# Patient Record
Sex: FEMALE | Race: WHITE | NOT HISPANIC OR LATINO | Employment: OTHER | URBAN - METROPOLITAN AREA
[De-identification: names, ages, dates, MRNs, and addresses within clinical notes are randomized per-mention and may not be internally consistent; named-entity substitution may affect disease eponyms.]

---

## 2017-03-28 ENCOUNTER — OFFICE VISIT (OUTPATIENT)
Dept: OPTOMETRY | Facility: CLINIC | Age: 77
End: 2017-03-28
Payer: MEDICARE

## 2017-03-28 DIAGNOSIS — H52.4 MYOPIA WITH ASTIGMATISM AND PRESBYOPIA, BILATERAL: ICD-10-CM

## 2017-03-28 DIAGNOSIS — R51.9 FREQUENT HEADACHES: Primary | ICD-10-CM

## 2017-03-28 DIAGNOSIS — H52.203 MYOPIA WITH ASTIGMATISM AND PRESBYOPIA, BILATERAL: ICD-10-CM

## 2017-03-28 DIAGNOSIS — H25.13 NUCLEAR SCLEROSIS, BILATERAL: ICD-10-CM

## 2017-03-28 DIAGNOSIS — H43.393 VITREOUS FLOATERS, BILATERAL: ICD-10-CM

## 2017-03-28 DIAGNOSIS — H43.393 VITREOUS SYNERESIS OF BOTH EYES: ICD-10-CM

## 2017-03-28 DIAGNOSIS — Z13.5 SCREENING FOR OTHER EYE CONDITIONS: ICD-10-CM

## 2017-03-28 DIAGNOSIS — H52.13 MYOPIA WITH ASTIGMATISM AND PRESBYOPIA, BILATERAL: ICD-10-CM

## 2017-03-28 PROCEDURE — 92015 DETERMINE REFRACTIVE STATE: CPT | Mod: ,,, | Performed by: OPTOMETRIST

## 2017-03-28 PROCEDURE — 99999 PR PBB SHADOW E&M-EST. PATIENT-LVL III: CPT | Mod: PBBFAC,,, | Performed by: OPTOMETRIST

## 2017-03-28 PROCEDURE — 99213 OFFICE O/P EST LOW 20 MIN: CPT | Mod: PBBFAC | Performed by: OPTOMETRIST

## 2017-03-28 PROCEDURE — 92014 COMPRE OPH EXAM EST PT 1/>: CPT | Mod: S$PBB,,, | Performed by: OPTOMETRIST

## 2017-03-28 NOTE — MR AVS SNAPSHOT
[facility]  [address]  [facility] [address]  Phone: [phone]  Fax: [phone]                  Luann Das   3/28/2017 1:30 PM   Office Visit    Description:  Female : 1940   Provider:  Westley Echols OD   Department:  [facility]           Reason for Visit     Headache           Diagnoses this Visit        Comments    Frequent headaches    -  Primary            To Do List           Goals (5 Years of Data)     None      [facility]     [facility] On Call Nurse Care Line -  Assistance  Registered nurses in the [facility] provide clinical advisement, health education, appointment booking, and other advisory services.  Call for this free service at [phone].             Medications           Message regarding Medications     Verify the changes and/or additions to your medication regime listed below are the same as discussed with your clinician today.  If any of these changes or additions are incorrect, please notify your healthcare provider.             Verify that the below list of medications is an accurate representation of the medications you are currently taking.  If none reported, the list may be blank. If incorrect, please contact your healthcare provider. Carry this list with you in case of emergency.           Current Medications     buPROPion (WELLBUTRIN SR) 150 MG TBSR 12 hr tablet TAKE 1 TABLET BY MOUTH ONCE DAILY    cholecalciferol, vitamin D3, (VITAMIN D3) 1,000 unit capsule Take 2,000 Units by mouth once daily.     citalopram (CELEXA) 20 MG tablet Take 20 mg by mouth once daily.     citalopram (CELEXA) 40 MG tablet TAKE 1 TABLET BY MOUTH EVERY DAY    citalopram (CELEXA) 40 MG tablet TAKE 1 TABLET BY MOUTH EVERY DAY    cyanocobalamin (VITAMIN B-12) 100 MCG tablet Take 1,000 mcg by mouth once daily.     esomeprazole (NEXIUM) 40 MG capsule Take 1 capsule (40 mg total) by mouth 2 (two) times daily.    lecithin 1,200 mg Cap  Take 1 tablet by mouth 2 (two) times daily.    levothyroxine (SYNTHROID) 100 MCG tablet Take 1 tablet (100 mcg total) by mouth once daily.    alendronate (FOSAMAX) 70 MG tablet TAKE 1 TABLET BY MOUTH EVERY WEEK AS DIRECTED    GLUCOSAMINE HCL (GLUCOSAMINE, BULK, MISC) by Misc.(Non-Drug; Combo Route) route.    rosuvastatin (CRESTOR) 5 MG tablet Take 5 mg by mouth once daily.           Clinical Reference Information           Allergies as of 3/28/2017     Statins-hmg-coa Reductase Inhibitors    Sulfa (Sulfonamide Antibiotics)      Immunizations Administered on Date of Encounter - 3/28/2017     None      Orders Placed During Today's Visit     Future Labs/Procedures Expected by Expires    C-reactive protein  3/28/2017 3/28/2018    Sedimentation rate, manual  3/28/2017 5/27/2018      Instructions    Complaint of recent bilateral temporal headaches, with neck pain in 77 year old  female.  Order ESR and C-Reactive Protein studies to help rule out inflammatory etiology of symptoms (GCA/temporal arteritis).    Bilateral nuclear sclerotic cataract, more apparent in the right eye.   Central punctate lens opacity in the left eye.    Vitreous floaters in both eyes.  No evidence of retinal etiology.    Prior diagnosis of bilateral dry eye.  Currently using Restasis Ophthalmic Emulsion two times per day, and artificial tears in both eyes as needed throughout the day.  Continue treatment regimen.    Ocular health appears good otherwise.     Myopia with astigmatism in each eye, and presbyopia consistent with age.  New spectacle lens Rx issued.  Recommend replacing lenses, in view of headaches, especially if ESR and C-Reactive Protein levels found to be within normal range.    Will call Mrs. Das to advise her of blood work ordered today.    Repeat general eye examination and refraction in one year, otherwise.          Language Assistance Services     ATTENTION: Language assistance services are available, free of charge.  Please call [phone].      ATENCIÓN: Si habla español, tiene a moody disposición servicios gratuitos de asistencia lingüística. Llame al [phone].     CHÚ Ý: N?u b?n nói Ti?ng Vi?t, có các d?ch v? h? tr? ngôn ng? mi?n phí dành cho b?n. G?i s? [phone].         [facility] complies with applicable Federal civil rights laws and does not discriminate on the basis of race, color, national origin, age, disability, or sex.

## 2017-03-28 NOTE — PATIENT INSTRUCTIONS
Complaint of recent bilateral temporal headaches, with neck pain in 77 year old  female.  Order ESR and C-Reactive Protein studies to help rule out inflammatory etiology of symptoms (GCA/temporal arteritis).    Bilateral nuclear sclerotic cataract, more apparent in the right eye.   Central punctate lens opacity in the left eye.    Vitreous floaters in both eyes.  No evidence of retinal etiology.    Prior diagnosis of bilateral dry eye.  Currently using Restasis Ophthalmic Emulsion two times per day, and artificial tears in both eyes as needed throughout the day.  Continue treatment regimen.    Ocular health appears good otherwise.     Myopia with astigmatism in each eye, and presbyopia consistent with age.  New spectacle lens Rx issued.  Recommend replacing lenses, in view of headaches, especially if ESR and C-Reactive Protein levels found to be within normal range.    Will call Mrs. Das to advise her of blood work ordered today.    Repeat general eye examination and refraction in one year, otherwise.     Reviewed results of blood work ordered:     CRP = 2.0 mg/L (normal)     ESR = 5.0 mm/hr (normal)  Results not consistent with giant cell arteritis/temporal arteritis.  Call Mrs. Das to advise her of the above.

## 2017-08-28 NOTE — PROGRESS NOTES
HPI     Headache    Additional comments: bilateral temporal headache(s), and neck pain.  Had   last Rx fabricated at [facility].            Comments   Patient's age: 77 y.o. [place]   Occupation: retired  Approximate date of last eye examination:  09/03/2015  Name of last eye doctor seen: Dr Echols  Wears glasses? yes     If yes, wears  Full-time or part-time?  Full-time  Present glasses are: Bifocal, SV Distance, SV Reading?  bifocals  Approximate age of present glasses:  1 year   Got new glasses following last exam, or subsequently?:  yes   Any problem with [facility] with glasses?   Wears CLs?:  no  Headaches? Frequently   Eye pain/discomfort?  Frequently                                                                                Flashes?  no  Floaters?  Yes, occasionally   Diplopia/Double vision?  no  Patient's Ocular History:         Any eye surgeries? no         Any eye injury?  abrasion         Any treatment for eye disease?  no  Family history of eye disease?  no  Significant patient medical history:         1. Diabetes?  no       If yes, IDDM or NIDDM? na   2. HBP?  no              3. Other (describe):  having parathyroid surgery  in October   in [place], [place] - 2/2 high calcium levels in blood.     ! OTC eyedrops currently using:  tears   ! Prescription eye meds currently using:  Restasis Ophthalmic Emulsion.    ! Any history of allergy/adverse reaction to any eye meds used   previously?  no    ! Any history of allergy/adverse reaction to eyedrops used during prior   eye exam(s)? no    ! Any history of allergy/adverse reaction to Novacaine or similar meds?   no   ! Any history of allergy/adverse reaction to Epinephrine or similar meds?   no    ! Patient okay with use of anesthetic eyedrops to check eye pressure?    yes        ! Patient okay with use of eyedrops to dilate pupils today?  yes   !  Allergies/Medications/Medical History/Family History reviewed today?    yes      PD =   65/61  Desired reading distance =   "14.5"                                                                    Last edited by Westley Echols, OD on 3/28/2017  2:53 PM. (History)            Assessment /Plan     For exam results, see Encounter Report.    1. Frequent headaches  Sedimentation rate, manual    C-reactive protein   2. Nuclear sclerosis, bilateral     3. Vitreous floaters, bilateral     4. Vitreous syneresis of both eyes     5. Myopia with astigmatism and presbyopia, bilateral     6. Screening for other eye conditions                  Complaint of recent bilateral temporal headaches, with neck pain in 77 year old  female.  Order ESR and C-Reactive Protein studies to help rule out inflammatory etiology of symptoms (GCA/temporal arteritis).    Bilateral nuclear sclerotic cataract, more apparent in the right eye.   Central punctate lens opacity in the left eye.    Vitreous floaters in both eyes.  No evidence of retinal etiology.    Prior diagnosis of bilateral dry eye.  Currently using Restasis Ophthalmic Emulsion two times per day, and artificial tears in both eyes as needed throughout the day.  Continue treatment regimen.    Ocular health appears good otherwise.     Myopia with astigmatism in each eye, and presbyopia consistent with age.  New spectacle lens Rx issued.  Recommend replacing lenses, in view of headaches, especially if ESR and C-Reactive Protein levels found to be within normal range.    Will call Mrs. Das to advise her of blood work ordered today.    Repeat general eye examination and refraction in one year, otherwise.     Reviewed results of blood work ordered:     CRP = 2.0 mg/L (normal)     ESR = 5.0 mm/hr (normal)  Results not consistent with giant cell arteritis/temporal arteritis.  Call Mrs. Das to advise her of the above.         " no